# Patient Record
Sex: MALE | Race: BLACK OR AFRICAN AMERICAN | NOT HISPANIC OR LATINO | Employment: FULL TIME | URBAN - METROPOLITAN AREA
[De-identification: names, ages, dates, MRNs, and addresses within clinical notes are randomized per-mention and may not be internally consistent; named-entity substitution may affect disease eponyms.]

---

## 2018-12-24 ENCOUNTER — HOSPITAL ENCOUNTER (EMERGENCY)
Facility: HOSPITAL | Age: 48
Discharge: HOME | End: 2018-12-24
Attending: INTERNAL MEDICINE
Payer: MEDICAID

## 2018-12-24 VITALS
HEIGHT: 71 IN | RESPIRATION RATE: 16 BRPM | HEART RATE: 105 BPM | TEMPERATURE: 100 F | OXYGEN SATURATION: 99 % | WEIGHT: 240 LBS | SYSTOLIC BLOOD PRESSURE: 138 MMHG | DIASTOLIC BLOOD PRESSURE: 96 MMHG | BODY MASS INDEX: 33.6 KG/M2

## 2018-12-24 DIAGNOSIS — L03.317 CELLULITIS OF RIGHT BUTTOCK: Primary | ICD-10-CM

## 2018-12-24 PROCEDURE — 99283 EMERGENCY DEPT VISIT LOW MDM: CPT

## 2018-12-24 PROCEDURE — 25000003 PHARM REV CODE 250: Performed by: INTERNAL MEDICINE

## 2018-12-24 RX ORDER — IBUPROFEN 400 MG/1
800 TABLET ORAL
Status: COMPLETED | OUTPATIENT
Start: 2018-12-24 | End: 2018-12-24

## 2018-12-24 RX ORDER — DOXYCYCLINE 100 MG/1
100 CAPSULE ORAL 2 TIMES DAILY
Qty: 20 CAPSULE | Refills: 0 | Status: ON HOLD | OUTPATIENT
Start: 2018-12-24 | End: 2018-12-29 | Stop reason: HOSPADM

## 2018-12-24 RX ORDER — DOXYCYCLINE HYCLATE 100 MG
100 TABLET ORAL
Status: COMPLETED | OUTPATIENT
Start: 2018-12-24 | End: 2018-12-24

## 2018-12-24 RX ORDER — IBUPROFEN 800 MG/1
800 TABLET ORAL EVERY 8 HOURS PRN
Qty: 30 TABLET | Refills: 0 | Status: ON HOLD | OUTPATIENT
Start: 2018-12-24 | End: 2018-12-29 | Stop reason: HOSPADM

## 2018-12-24 RX ADMIN — IBUPROFEN 800 MG: 400 TABLET, FILM COATED ORAL at 06:12

## 2018-12-24 RX ADMIN — DOXYCYCLINE HYCLATE 100 MG: 100 TABLET, COATED ORAL at 06:12

## 2018-12-24 NOTE — ED PROVIDER NOTES
Encounter Date: 12/24/2018    SCRIBE #1 NOTE: I, Ophelia Urbina, am scribing for, and in the presence of,  Dr. Lion. I have scribed the following portions of the note - Other sections scribed: HPI, ROS, PE.       History     Chief Complaint   Patient presents with    Abscess     boil reported to top of gluteal fold that is swollen and painful with no drainage x2 days and subjective fevers      48 y.o male presents with boil on his right buttock for 2 days. He states it is painful and tender. He denies fever, chills, and drainage.       The history is provided by the patient.     Review of patient's allergies indicates:  No Known Allergies  History reviewed. No pertinent past medical history.  Past Surgical History:   Procedure Laterality Date    SKIN GRAFT      R leg    WRIST ARTHROPLASTY       History reviewed. No pertinent family history.  Social History     Tobacco Use    Smoking status: Current Every Day Smoker     Packs/day: 1.00     Years: 25.00     Pack years: 25.00     Types: Cigarettes   Substance Use Topics    Alcohol use: No    Drug use: No     Review of Systems   Constitutional: Negative for chills and fever.   Musculoskeletal:        Right buttock pain   Skin: Positive for color change.   All other systems reviewed and are negative.      Physical Exam     Initial Vitals [12/24/18 1742]   BP Pulse Resp Temp SpO2   (!) 174/95 108 16 99.7 °F (37.6 °C) 99 %      MAP       --         Physical Exam    Nursing note and vitals reviewed.  Constitutional: He appears well-developed and well-nourished. He is not diaphoretic. No distress.   HENT:   Head: Normocephalic and atraumatic.   Right Ear: External ear normal.   Left Ear: External ear normal.   Eyes: EOM are normal.   Neck: Normal range of motion.   Pulmonary/Chest: No respiratory distress.   Abdominal: Soft. Bowel sounds are normal. He exhibits no distension.   Musculoskeletal: Normal range of motion.   Neurological: He is alert and oriented to person,  place, and time. No cranial nerve deficit.   Skin: Skin is warm, dry and intact. Capillary refill takes less than 2 seconds.        Psychiatric: He has a normal mood and affect. His behavior is normal.         ED Course   Procedures  Labs Reviewed - No data to display       Imaging Results    None          Medical Decision Making:   Initial Assessment:   48 y.o male presents with boil on his right buttock for 2 days. He states it is painful and tender. He denies fever, chills, and drainage.             Scribe Attestation:   Scribe #1: I performed the above scribed service and the documentation accurately describes the services I performed. I attest to the accuracy of the note.    This document was produced by a scribe under my direction and in my presence. I agree with the content of the note and have made any necessary edits.     Dr. Lion    12/24/2018 6:09 PM             Clinical Impression:     1. Cellulitis of right buttock          Disposition:   Disposition: Discharged  Condition: Stable                        Genaro Lion MD  12/24/18 7780

## 2018-12-26 ENCOUNTER — HOSPITAL ENCOUNTER (INPATIENT)
Facility: HOSPITAL | Age: 48
LOS: 2 days | Discharge: HOME | DRG: 603 | End: 2018-12-29
Attending: EMERGENCY MEDICINE | Admitting: HOSPITALIST
Payer: MEDICAID

## 2018-12-26 DIAGNOSIS — D72.829 LEUKOCYTOSIS, UNSPECIFIED TYPE: ICD-10-CM

## 2018-12-26 DIAGNOSIS — Z78.9 FAILURE OF OUTPATIENT TREATMENT: ICD-10-CM

## 2018-12-26 DIAGNOSIS — L03.317 CELLULITIS OF BUTTOCK, RIGHT: Primary | ICD-10-CM

## 2018-12-26 LAB
ALBUMIN SERPL-MCNC: 3.5 G/DL (ref 3.3–5.5)
ALP SERPL-CCNC: 78 U/L (ref 42–141)
BILIRUB SERPL-MCNC: 0.4 MG/DL (ref 0.2–1.6)
BUN SERPL-MCNC: 15 MG/DL (ref 7–22)
CALCIUM SERPL-MCNC: 8.6 MG/DL (ref 8–10.3)
CHLORIDE SERPL-SCNC: 108 MMOL/L (ref 98–108)
CREAT SERPL-MCNC: 1.2 MG/DL (ref 0.6–1.2)
GLUCOSE SERPL-MCNC: 101 MG/DL (ref 73–118)
POC ALT (SGPT): 34 U/L (ref 10–47)
POC AST (SGOT): 28 U/L (ref 11–38)
POC TCO2: 28 MMOL/L (ref 18–33)
POTASSIUM BLD-SCNC: 3.8 MMOL/L (ref 3.6–5.1)
PROTEIN, POC: 6.5 G/DL (ref 6.4–8.1)
SODIUM BLD-SCNC: 145 MMOL/L (ref 128–145)

## 2018-12-26 PROCEDURE — 85025 COMPLETE CBC W/AUTO DIFF WBC: CPT

## 2018-12-26 PROCEDURE — 63600175 PHARM REV CODE 636 W HCPCS: Performed by: NURSE PRACTITIONER

## 2018-12-26 PROCEDURE — S0030 INJECTION, METRONIDAZOLE: HCPCS | Performed by: NURSE PRACTITIONER

## 2018-12-26 PROCEDURE — 96365 THER/PROPH/DIAG IV INF INIT: CPT

## 2018-12-26 PROCEDURE — 87040 BLOOD CULTURE FOR BACTERIA: CPT | Mod: 59

## 2018-12-26 PROCEDURE — 80053 COMPREHEN METABOLIC PANEL: CPT

## 2018-12-26 PROCEDURE — 25500020 PHARM REV CODE 255: Performed by: EMERGENCY MEDICINE

## 2018-12-26 PROCEDURE — 96361 HYDRATE IV INFUSION ADD-ON: CPT

## 2018-12-26 PROCEDURE — 96375 TX/PRO/DX INJ NEW DRUG ADDON: CPT

## 2018-12-26 PROCEDURE — 25000003 PHARM REV CODE 250: Performed by: NURSE PRACTITIONER

## 2018-12-26 PROCEDURE — 99285 EMERGENCY DEPT VISIT HI MDM: CPT | Mod: 25

## 2018-12-26 RX ORDER — METRONIDAZOLE 500 MG/100ML
500 INJECTION, SOLUTION INTRAVENOUS
Status: COMPLETED | OUTPATIENT
Start: 2018-12-26 | End: 2018-12-26

## 2018-12-26 RX ORDER — MORPHINE SULFATE 8 MG/ML
8 INJECTION INTRAMUSCULAR; INTRAVENOUS; SUBCUTANEOUS
Status: COMPLETED | OUTPATIENT
Start: 2018-12-26 | End: 2018-12-26

## 2018-12-26 RX ORDER — CIPROFLOXACIN 2 MG/ML
400 INJECTION, SOLUTION INTRAVENOUS
Status: DISCONTINUED | OUTPATIENT
Start: 2018-12-26 | End: 2018-12-26

## 2018-12-26 RX ADMIN — MORPHINE SULFATE 8 MG: 8 INJECTION INTRAVENOUS at 06:12

## 2018-12-26 RX ADMIN — METRONIDAZOLE 500 MG: 500 INJECTION, SOLUTION INTRAVENOUS at 10:12

## 2018-12-26 RX ADMIN — SODIUM CHLORIDE 1000 ML: 0.9 INJECTION, SOLUTION INTRAVENOUS at 06:12

## 2018-12-26 RX ADMIN — IOHEXOL 100 ML: 350 INJECTION, SOLUTION INTRAVENOUS at 07:12

## 2018-12-27 PROBLEM — Z72.0 TOBACCO ABUSE: Status: ACTIVE | Noted: 2018-12-27

## 2018-12-27 LAB
ANION GAP SERPL CALC-SCNC: 8 MMOL/L
BASOPHILS # BLD AUTO: 0.05 K/UL
BASOPHILS NFR BLD: 0.5 %
BUN SERPL-MCNC: 13 MG/DL
CALCIUM SERPL-MCNC: 8.9 MG/DL
CHLORIDE SERPL-SCNC: 104 MMOL/L
CO2 SERPL-SCNC: 30 MMOL/L
CREAT SERPL-MCNC: 1 MG/DL
DIFFERENTIAL METHOD: ABNORMAL
EOSINOPHIL # BLD AUTO: 0.3 K/UL
EOSINOPHIL NFR BLD: 3 %
ERYTHROCYTE [DISTWIDTH] IN BLOOD BY AUTOMATED COUNT: 13.2 %
EST. GFR  (AFRICAN AMERICAN): >60 ML/MIN/1.73 M^2
EST. GFR  (NON AFRICAN AMERICAN): >60 ML/MIN/1.73 M^2
GLUCOSE SERPL-MCNC: 103 MG/DL
HCT VFR BLD AUTO: 38.7 %
HGB BLD-MCNC: 11.7 G/DL
LYMPHOCYTES # BLD AUTO: 3.1 K/UL
LYMPHOCYTES NFR BLD: 31.4 %
MCH RBC QN AUTO: 28.5 PG
MCHC RBC AUTO-ENTMCNC: 30.2 G/DL
MCV RBC AUTO: 94 FL
MONOCYTES # BLD AUTO: 1.1 K/UL
MONOCYTES NFR BLD: 10.6 %
NEUTROPHILS # BLD AUTO: 5.4 K/UL
NEUTROPHILS NFR BLD: 54.5 %
PLATELET # BLD AUTO: 236 K/UL
PMV BLD AUTO: 10.5 FL
POTASSIUM SERPL-SCNC: 4.1 MMOL/L
RBC # BLD AUTO: 4.11 M/UL
SODIUM SERPL-SCNC: 142 MMOL/L
WBC # BLD AUTO: 9.89 K/UL

## 2018-12-27 PROCEDURE — 25000003 PHARM REV CODE 250: Performed by: NURSE PRACTITIONER

## 2018-12-27 PROCEDURE — S4991 NICOTINE PATCH NONLEGEND: HCPCS | Performed by: HOSPITALIST

## 2018-12-27 PROCEDURE — 63600175 PHARM REV CODE 636 W HCPCS: Performed by: NURSE PRACTITIONER

## 2018-12-27 PROCEDURE — 25000003 PHARM REV CODE 250: Performed by: EMERGENCY MEDICINE

## 2018-12-27 PROCEDURE — 25000003 PHARM REV CODE 250: Performed by: HOSPITALIST

## 2018-12-27 PROCEDURE — 63600175 PHARM REV CODE 636 W HCPCS: Performed by: HOSPITALIST

## 2018-12-27 PROCEDURE — 80048 BASIC METABOLIC PNL TOTAL CA: CPT

## 2018-12-27 PROCEDURE — 96376 TX/PRO/DX INJ SAME DRUG ADON: CPT

## 2018-12-27 PROCEDURE — 85025 COMPLETE CBC W/AUTO DIFF WBC: CPT

## 2018-12-27 PROCEDURE — S0030 INJECTION, METRONIDAZOLE: HCPCS | Performed by: NURSE PRACTITIONER

## 2018-12-27 PROCEDURE — 11000001 HC ACUTE MED/SURG PRIVATE ROOM

## 2018-12-27 PROCEDURE — 36415 COLL VENOUS BLD VENIPUNCTURE: CPT

## 2018-12-27 RX ORDER — CLONIDINE HYDROCHLORIDE 0.1 MG/1
0.1 TABLET ORAL
Status: DISCONTINUED | OUTPATIENT
Start: 2018-12-27 | End: 2018-12-27

## 2018-12-27 RX ORDER — CLONIDINE HYDROCHLORIDE 0.1 MG/1
0.3 TABLET ORAL
Status: COMPLETED | OUTPATIENT
Start: 2018-12-27 | End: 2018-12-27

## 2018-12-27 RX ORDER — MORPHINE SULFATE 10 MG/ML
5 INJECTION INTRAMUSCULAR; INTRAVENOUS; SUBCUTANEOUS EVERY 4 HOURS PRN
Status: DISCONTINUED | OUTPATIENT
Start: 2018-12-27 | End: 2018-12-29 | Stop reason: HOSPADM

## 2018-12-27 RX ORDER — IBUPROFEN 200 MG
1 TABLET ORAL DAILY
Status: DISCONTINUED | OUTPATIENT
Start: 2018-12-27 | End: 2018-12-29 | Stop reason: HOSPADM

## 2018-12-27 RX ORDER — METRONIDAZOLE 500 MG/100ML
500 INJECTION, SOLUTION INTRAVENOUS
Status: DISCONTINUED | OUTPATIENT
Start: 2018-12-27 | End: 2018-12-29 | Stop reason: HOSPADM

## 2018-12-27 RX ORDER — OXYCODONE AND ACETAMINOPHEN 5; 325 MG/1; MG/1
1 TABLET ORAL EVERY 6 HOURS PRN
Status: DISCONTINUED | OUTPATIENT
Start: 2018-12-27 | End: 2018-12-29 | Stop reason: HOSPADM

## 2018-12-27 RX ORDER — MORPHINE SULFATE 10 MG/ML
2 INJECTION INTRAMUSCULAR; INTRAVENOUS; SUBCUTANEOUS EVERY 4 HOURS PRN
Status: DISCONTINUED | OUTPATIENT
Start: 2018-12-27 | End: 2018-12-27

## 2018-12-27 RX ORDER — ONDANSETRON 2 MG/ML
4 INJECTION INTRAMUSCULAR; INTRAVENOUS EVERY 8 HOURS PRN
Status: DISCONTINUED | OUTPATIENT
Start: 2018-12-27 | End: 2018-12-29 | Stop reason: HOSPADM

## 2018-12-27 RX ORDER — OXYCODONE AND ACETAMINOPHEN 10; 325 MG/1; MG/1
1 TABLET ORAL EVERY 6 HOURS PRN
Status: DISCONTINUED | OUTPATIENT
Start: 2018-12-27 | End: 2018-12-29 | Stop reason: HOSPADM

## 2018-12-27 RX ORDER — MORPHINE SULFATE 10 MG/ML
4 INJECTION INTRAMUSCULAR; INTRAVENOUS; SUBCUTANEOUS EVERY 4 HOURS PRN
Status: DISCONTINUED | OUTPATIENT
Start: 2018-12-27 | End: 2018-12-27

## 2018-12-27 RX ORDER — ACETAMINOPHEN 325 MG/1
650 TABLET ORAL EVERY 6 HOURS PRN
Status: DISCONTINUED | OUTPATIENT
Start: 2018-12-27 | End: 2018-12-29 | Stop reason: HOSPADM

## 2018-12-27 RX ORDER — MORPHINE SULFATE 8 MG/ML
4 INJECTION INTRAMUSCULAR; INTRAVENOUS; SUBCUTANEOUS EVERY 4 HOURS PRN
Status: DISCONTINUED | OUTPATIENT
Start: 2018-12-27 | End: 2018-12-27 | Stop reason: SDUPTHER

## 2018-12-27 RX ADMIN — OXYCODONE HYDROCHLORIDE AND ACETAMINOPHEN 1 TABLET: 10; 325 TABLET ORAL at 04:12

## 2018-12-27 RX ADMIN — AMPICILLIN AND SULBACTAM 1.5 G: 1; .5 INJECTION, POWDER, FOR SOLUTION INTRAVENOUS at 04:12

## 2018-12-27 RX ADMIN — MORPHINE SULFATE 5 MG: 10 INJECTION INTRAVENOUS at 05:12

## 2018-12-27 RX ADMIN — NICOTINE 1 PATCH: 21 PATCH, EXTENDED RELEASE TRANSDERMAL at 08:12

## 2018-12-27 RX ADMIN — CLONIDINE HYDROCHLORIDE 0.3 MG: 0.1 TABLET ORAL at 12:12

## 2018-12-27 RX ADMIN — MORPHINE SULFATE 4 MG: 8 INJECTION INTRAVENOUS at 12:12

## 2018-12-27 RX ADMIN — OXYCODONE HYDROCHLORIDE AND ACETAMINOPHEN 1 TABLET: 10; 325 TABLET ORAL at 11:12

## 2018-12-27 RX ADMIN — AMPICILLIN AND SULBACTAM 1.5 G: 1; .5 INJECTION, POWDER, FOR SOLUTION INTRAVENOUS at 08:12

## 2018-12-27 RX ADMIN — MORPHINE SULFATE 5 MG: 10 INJECTION INTRAVENOUS at 10:12

## 2018-12-27 RX ADMIN — AMPICILLIN AND SULBACTAM 1.5 G: 1; .5 INJECTION, POWDER, FOR SOLUTION INTRAVENOUS at 09:12

## 2018-12-27 RX ADMIN — OXYCODONE HYDROCHLORIDE AND ACETAMINOPHEN 1 TABLET: 10; 325 TABLET ORAL at 08:12

## 2018-12-27 RX ADMIN — METRONIDAZOLE 500 MG: 500 INJECTION, SOLUTION INTRAVENOUS at 11:12

## 2018-12-27 RX ADMIN — METRONIDAZOLE 500 MG: 500 INJECTION, SOLUTION INTRAVENOUS at 06:12

## 2018-12-27 RX ADMIN — METRONIDAZOLE 500 MG: 500 INJECTION, SOLUTION INTRAVENOUS at 02:12

## 2018-12-27 NOTE — ED PROVIDER NOTES
Encounter Date: 12/26/2018       History     Chief Complaint   Patient presents with    Abscess     pt reports he has had a boil on the top of buttocks x 3 days. pt reports boil drained yesterday. pt reports taking prescribed medication for abscess that was given earlier this week but denies any relief     CC: Abscess    HPI: Rajeev Chowdhury, a 48 y.o. male that presents to the ED for drainage from an abscess to the right buttock.  He was seen in this emergency department 2 days ago for similar, prescribed antibiotics.  He reports that the wound started draining this morning.  He reports draining pus and blood.  He reports adherence to his doxycycline regimen.  He reports no fevers or abdominal pain.      The history is provided by the patient. No  was used.     Review of patient's allergies indicates:  No Known Allergies  History reviewed. No pertinent past medical history.  Past Surgical History:   Procedure Laterality Date    SKIN GRAFT      R leg    WRIST ARTHROPLASTY       History reviewed. No pertinent family history.  Social History     Tobacco Use    Smoking status: Current Every Day Smoker     Packs/day: 1.00     Years: 25.00     Pack years: 25.00     Types: Cigarettes   Substance Use Topics    Alcohol use: No    Drug use: No     Review of Systems   Constitutional: Negative for fever.   HENT: Negative for trouble swallowing.    Respiratory: Negative for shortness of breath.    Cardiovascular: Negative for chest pain.   Gastrointestinal: Negative for abdominal pain and vomiting.   Musculoskeletal: Negative for back pain and neck pain.   Skin: Positive for color change ( erythema the buttock) and wound (Buttock).   Psychiatric/Behavioral: Negative for confusion.       Physical Exam     Initial Vitals [12/26/18 1816]   BP Pulse Resp Temp SpO2   (S) (!) 168/114 95 18 97.9 °F (36.6 °C) 98 %      MAP       --         Physical Exam    Nursing note and vitals reviewed.  Constitutional: He  appears well-developed and well-nourished. He is not diaphoretic. He is cooperative.  Non-toxic appearance. No distress.   HENT:   Head: Normocephalic and atraumatic.   Right Ear: External ear normal.   Left Ear: External ear normal.   Mouth/Throat: Oropharynx is clear and moist.   Eyes: Conjunctivae and EOM are normal.   Neck: Normal range of motion.   Cardiovascular: Normal rate and regular rhythm.   Pulmonary/Chest: No respiratory distress.   Abdominal: He exhibits no distension. There is no tenderness. There is no rebound.   Musculoskeletal: Normal range of motion.   Neurological: He is alert and oriented to person, place, and time. GCS eye subscore is 4. GCS verbal subscore is 5. GCS motor subscore is 6.   Skin: Skin is warm and dry. Abscess noted. No rash noted. There is erythema.        Erythema and induration to the right of the gluteal cleft. Induration extends inferiorly towards rectum.    Psychiatric: He has a normal mood and affect. His behavior is normal. Judgment and thought content normal.         ED Course   Procedures  Labs Reviewed   POCT CMP - Abnormal; Notable for the following components:       Result Value    POC Chloride 108 (*)     All other components within normal limits   CULTURE, BLOOD   CULTURE, BLOOD   POCT CBC   POCT CMP          Imaging Results          CT Pelvis With Contrast (Final result)  Result time 12/26/18 19:45:30    Final result by Freddy Lion III, MD (12/26/18 19:45:30)                 Impression:      See above    Right paramidline gluteal fold cellulitis.  A fistulous tract to the anorectal junction cannot be excluded.  MRI may be better suited for this possibility.      Electronically signed by: Freddy Lion MD  Date:    12/26/2018  Time:    19:45             Narrative:    EXAMINATION:  CT PELVIS WITH  CONTRAST    CLINICAL HISTORY:  Abscess of anal and rectal regions;    FINDINGS:  Patient was administered 100 cc of Omnipaque 350.  There is a cellulitis and edema  pattern of the right paramidline gluteal fold fat.  A fistulous connection to the anorectal junction cannot be excluded.  No discrete drainable abscess is seen.  This is more likely cellulitis.                                 Medical Decision Making:   History:   Old Medical Records: I decided to obtain old medical records.  Old Records Summarized: records from previous admission(s).       <> Summary of Records: Patient evaluated in this emergency department on 12/24/2018.  Diagnosed with cellulitis of the right buttock.  The wound was not drained at that time.         APC / Resident Notes:   This is an evaluation of a 48 y.o. male that presents to the Emergency Department for a draining wound/abscess. Patient was seen here on 12/24/2018 and diagnosed with cellulitis. Started draining today. Physical Exam shows a non-toxic, afebrile, and well appearing male. There is a 10 Sup/Inf cm x 4 Med/Lat cm area of induration and erythema to the right side of the gluteal cleft. The induration is extending down towards the rectum and is painful to palpation. There is drainage present on the skin but not drainage expressed on palpation. Results:  CBC with a leukocytosis at 12.3.  Elevation granulocytes today 8.8.  Normal platelet count.  Chemistry with a chloride of 108, otherwise unremarkable. CT of the pelvis with gluteal fold cellulitis and unable to rule out a fistulous tract to the anorectal junction.     Consult: After reviewing the CT scan and lab work with Dr. Leiva, General Surgery at  was consulted. I spoke with Dr. Jain (gen sx resident) and reviewed the case. She will speak with her attending and return the call. I called Dr. Jain back to discuss. She advised there would be no emergent cause to take to surgery at this time. Will admit to medicine. Dr. Phillip Paged with  at [facility].  I spoke with Dr. Phillip regarding the case.  He will accept admission to the hospital service on behalf of Dr. Ribera.   Recommendations for ampicillin and Flagyl.    My overall impression is cellulitis of the right buttock and failed outpatient therapy given he has been on the doxycycline for 48 hr.  No definite abscess identified to drain in the emergency department.    ED course: IV Abx, IV Fluids, IV Pain Medication. After review of the patients physical exam, ED testing, and history/symptoms, the patient will be admitted. IM was paged. Call returned and the case was discussed.  Dr. Phillip will accept the admission to the IM Service for Dr. Zavala with recommendations for IV Abx and Blood Cultures.  Admit orders will be completed. The diagnosis, treatment and plan for admission were discussed with the patient and understanding was verbalized. All questions or concerns have been addressed. This case was discussed with Dr. Leiva who is in agreement with my assessment and plan. PRISCILA Angeles, SELVIN                 ED Course as of Dec 26 2155   Wed Dec 26, 2018   1813 Triage Sort Note: Rajeev Chowdhury, a nontoxic/well appearing, 48 y.o. male, presented to the ED with c/o abscess to top of buttocks. He states it has been there for 3 days. It did pop and blood pus came out.     Patient seen and medically screened by Nurse Practitioner in triage. Orders initiated at triage to expedite care. Care will be transferred to an alternate provider when patient was placed in an Exam Room from the [facility] for physical exam, additional orders, and disposition.  6:15 PM Gaye Busch DNP, ANITA-BC      [AT]   2010 Call Placed to Gen S.   [AF]      ED Course User Index  [AF] ANITA Cerrato  [AT] ANITA Alfonso     Clinical Impression:   The primary encounter diagnosis was Cellulitis of buttock, right. Diagnoses of Failure of outpatient treatment and Leukocytosis, unspecified type were also pertinent to this visit.      Disposition:   Disposition: Admitted  Condition: Stable                        ANITA Cerrato  12/26/18 2156

## 2018-12-27 NOTE — H&P
[facility] Hospital Medicine  History & Physical    Patient Name: Rajeev Chowdhury  MRN: [id number]  Admission Date: 12/27/2018  Attending Physician: Des Phillip MD, MPH      PCP:     Primary Doctor No    CC:     Chief Complaint   Patient presents with    Abscess     pt reports he has had a boil on the top of buttocks x 3 days. pt reports boil drained yesterday. pt reports taking prescribed medication for abscess that was given earlier this week but denies any relief       HISTORY OF PRESENT ILLNESS:     Rajeev Chowdhury is a 48 y.o. male that (in part)  has no past medical history on file.  has a past surgical history that includes Wrist Arthroplasty and Skin graft. Presents to [facility] as a transfer patient from the [facility] stand-alone Emergency Department where he sought evaluation for erythema, pain, and warmth of his right upper buttocks.  He had an abscess drained in the area 2 days prior to presentation.  He is prescribed doxycycline after that and discharged home.  He has been compliant with his home medication regimen.  However he has had associated purulence and bloody drainage from the wound with increased warmth, edema, and erythema.  Daily frequency.  Constant duration.  Radiation throughout gluteal cleft.      In the emergency department routine laboratory studies were obtained which showed evidence of leukocytosis.  A call was placed to General surgery case discussed with him.  CT was performed that did no show evidence of further abscess formation.  Felt most likely to be cellulitis S failed outpatient antibiotic therapy.  Cultures were obtained and the patient was started on ampicillin sulbactam and Flagyl.    Hospital medicine has been asked to admit for further evaluation and treatment as a transfer patient.       REVIEW OF SYSTEMS:     -- Constitutional: No fever or chills.  -- Eyes: No visual changes, diplopia, pain, tearing, blind spots, or  discharge.   -- Ears, nose, mouth, throat, and face: No congestion, sore throat, epistaxis, d/c, bleeding gums, neck stiffness masses, or dental issues.  -- Respiratory: No cough, shortness of breath, hemoptysis, stridor, wheezing, or night sweats.  -- Cardiovascular: No chest pain, PAYNE, syncope, PND, edema, cyanosis, or palpitations.   -- Gastrointestinal: No vomiting, abdominal pain, hematemesis, melena, dyspepsia, or change in bowel habits.  -- Genitourinary: No hematuria, dysuria, frequency, urgency, nocturia, polyuria, stones, or incontinence.  -- Integument/breast:  As above in HPI  -- Hematologic/lymphatic: No easy bruising or lymphadenopathy.   -- Musculoskeletal:  Buttock pain. No acute arthralgias, acute myalgias, joint swelling, acute limitations of ROM, or acute muscular weakness.  -- Neurological: No seizures, headaches, incoordination, paraesthesias, ataxia, vertigo, or tremors.  -- Behavioral/Psych: No auditory or visual hallucinations, depression, or suicidal/homicidal ideations.  -- Endocrine: No heat or cold intolerance, polydipsia, or unintentional weight gain / loss.  -- Allergy/Immunologic: No recurrent infections or adverse reaction to food, insects, or difficulty breathing.    Pain Scale  5 on scale of 1 to 10    PAST MEDICAL / SURGICAL HISTORY:   History reviewed. No pertinent past medical history.  Past Surgical History:   Procedure Laterality Date    SKIN GRAFT      R leg    WRIST ARTHROPLASTY           FAMILY HISTORY:     Family History   Problem Relation Age of Onset    Diabetes Mother     Hypertension Mother     Hypertension Father     Diabetes Father     Diabetes Maternal Aunt     Diabetes Maternal Grandmother     Hypertension Maternal Grandmother          SOCIAL HISTORY:     Social History     Socioeconomic History    Marital status:      Spouse name: None    Number of children: None    Years of education: None    Highest education level: None   Social Needs     Financial resource strain: None    Food insecurity - worry: None    Food insecurity - inability: None    Transportation needs - medical: None    Transportation needs - non-medical: None   Occupational History    None   Tobacco Use    Smoking status: Current Every Day Smoker     Packs/day: 1.00     Years: 30.00     Pack years: 30.00     Types: Cigarettes   Substance and Sexual Activity    Alcohol use: No     Comment: less than once per week    Drug use: No    Sexual activity: None   Other Topics Concern    None   Social History Narrative    None         ALLERGIES:       Review of patient's allergies indicates:  No Known Allergies      HEALTH SCREENING:     Influenza vaccine not up-to-date for this season.        HOME MEDICATIONS:     Prior to Admission medications    Medication Sig Start Date End Date Taking? Authorizing Provider   doxycycline (VIBRAMYCIN) 100 MG Cap Take 1 capsule (100 mg total) by mouth 2 (two) times daily. for 10 days 12/24/18 1/3/19 Yes Genaro Lion MD   ibuprofen (ADVIL,MOTRIN) 800 MG tablet Take 1 tablet (800 mg total) by mouth every 8 (eight) hours as needed for Pain. 12/24/18  Yes Genaro Lion MD   albuterol 90 mcg/actuation inhaler Inhale 2 puffs into the lungs every 4 (four) hours as needed for Wheezing. 7/9/16 8/8/16  Martha Belcher MD          [facility] MEDICATIONS:     Scheduled Meds:    ampicillin-sulbactim (UNASYN) IVPB  1.5 g Intravenous Q6H    metronidazole  500 mg Intravenous Q8H    nicotine  1 patch Transdermal Daily     Continuous Infusions:   PRN Meds: acetaminophen, morphine, ondansetron, oxyCODONE-acetaminophen, oxyCODONE-acetaminophen      PHYSICAL EXAM:     Wt Readings from Last 1 Encounters:   12/27/18 0212 108.2 kg (238 lb 9.6 oz)   12/26/18 1816 108.9 kg (240 lb)     Body mass index is 33.28 kg/m².  Vitals:    12/27/18 0049 12/27/18 0211 12/27/18 0212 12/27/18 0357   BP: (!) 204/111  (!) 177/97 (!) 156/91   BP Location:   Right arm Right arm  "  Patient Position:   Lying Lying   Pulse: 97 84 84 80   Resp:  18 20 18   Temp:  98.3 °F (36.8 °C) 98.3 °F (36.8 °C) 98 °F (36.7 °C)   TempSrc:  Oral Oral Oral   SpO2: 96% 95% (!) 92% (!) 94%   Weight:   108.2 kg (238 lb 9.6 oz)    Height:   5' 11" (1.803 m)         -- General appearance: well developed. appears stated age   -- Head: normocephalic, atraumatic   -- Eyes: conjunctivae clear. Extraocular muscles intact  -- Nose: Nares normal. Septum midline.   -- Mouth/Throat: lips, mucosa, and tongue normal. no throat erythema.   -- Neck: supple, symmetrical, trachea midline, no JVD and thyroid not grossly enlarged, appears symmetric  -- Lungs: clear to auscultation bilaterally. normal respiratory effort. No use of accessory muscles.   -- Chest wall: no tenderness. equal bilateral chest rise   -- Heart: regular rate and regular rhythm. S1, S2 normal.  no click, rub or gallop   -- Abdomen: soft, non-tender, non-distended, non-tympanic; bowel sounds normal; no masses  -- Extremities: no cyanosis, clubbing or edema.   -- Pulses: 2+ and symmetric   -- Skin: 10 Sup/Inf cm x 4 Med/Lat cm area of induration and erythema to the right side of the gluteal cleft  -- Neurologic: Normal strength and tone. No focal numbness or weakness. CNII-XII intact. Pablo coma scale: eyes open spontaneously-4, oriented & converses-5, obeys commands-6.      LABORATORY STUDIES:     Recent Results (from the past 36 hour(s))   POCT CMP    Collection Time: 12/26/18  7:03 PM   Result Value Ref Range    Albumin, POC 3.5 3.3 - 5.5 g/dL    Alkaline Phosphatase, POC 78 42 - 141 U/L    ALT (SGPT), POC 34 10 - 47 U/L    AST (SGOT), POC 28 11 - 38 U/L    POC BUN 15 7 - 22 mg/dL    Calcium, POC 8.6 8.0 - 10.3 mg/dL    POC Chloride 108 (H) 98 - 108 mmol/L    POC Creatinine 1.2 0.6 - 1.2 mg/dL    POC Glucose 101 73 - 118 mg/dL    POC Potassium 3.8 3.6 - 5.1 mmol/L    POC Sodium 145 128 - 145 mmol/L    Bilirubin 0.4 0.2 - 1.6 mg/dL    POC TCO2 28 18 - 33 " mmol/L    Protein 6.5 6.4 - 8.1 g/dL       No results found for: INR, PROTIME  No results found for: HGBA1C  No results for input(s): POCTGLUCOSE in the last 72 hours.        MICROBIOLOGY DATA:     No results found for: LABGENI, LABREFE, LABUPPE, LABURIN, LABAFB    Microbiology x 7d:   Microbiology Results (last 7 days)     Procedure Component Value Units Date/Time    Blood Culture #2 **CANNOT BE ORDERED STAT** [[id number]] Collected:  12/26/18 2236    Order Status:  Sent Specimen:  Blood from Peripheral, Forearm, Right Updated:  12/26/18 2303    Blood Culture #1 **CANNOT BE ORDERED STAT** [[id number]] Collected:  12/26/18 2233    Order Status:  Sent Specimen:  Blood from Peripheral, Hand, Right Updated:  12/26/18 2302            IMAGING:     Imaging Results          CT Pelvis With Contrast (Final result)  Result time 12/26/18 19:45:30    Final result by Freddy Lion III, MD (12/26/18 19:45:30)                 Impression:      See above    Right paramidline gluteal fold cellulitis.  A fistulous tract to the anorectal junction cannot be excluded.  MRI may be better suited for this possibility.      Electronically signed by: Freddy Lion MD  Date:    12/26/2018  Time:    19:45             Narrative:    EXAMINATION:  CT PELVIS WITH  CONTRAST    CLINICAL HISTORY:  Abscess of anal and rectal regions;    FINDINGS:  Patient was administered 100 cc of Omnipaque 350.  There is a cellulitis and edema pattern of the right paramidline gluteal fold fat.  A fistulous connection to the anorectal junction cannot be excluded.  No discrete drainable abscess is seen.  This is more likely cellulitis.                                    ASSESSMENT & PLAN:     Primary Diagnosis:  Cellulitis of gluteal region    Active Hospital Problems    Diagnosis  POA    *Cellulitis of gluteal region [L03.317]  Yes     Priority: 1 - High    Tobacco abuse [Z72.0]  Yes      Resolved Hospital Problems   No resolved problems to display.        Cellulitis of right gluteus status post I&D of abscess  · As evidence by history, physical exam, and CT imaging  · Cultures obtained but likely be low yield in cellulitis.  · Appears to have failed outpatient antibiotic therapy with doxycycline therefore being admitted for IV antibiotics  · Wound care      Tobacco abuse   · Chronic tobacco use  · Tobacco cessation counseling  · Nicotine patch offered; consider Wellbutrin or Chantix through PCP as an outpatient (will require closer monitoring)  · I discussed with the patient regarding the hazardous effects of smoking on increasing risk of heart attack and stroke, worsening lung functions, and increasing cancer risk.   Patient was urged to stop smoking now.  I also offered nicotine taper (such as nicotine patch and gum) to help ease the craving to smoke.          VTE Risk Mitigation (From admission, onward)        Ordered     IP VTE HIGH RISK PATIENT  Once      12/27/18 0155     Place sequential compression device  Until discontinued      12/27/18 0155     Place BIBI hose  Until discontinued      12/27/18 0155            Adult PRN medications available   DVT prophylaxis given       DISPOSITION:     Will admit to the Hospital Medicine service for further evaluation and treatment.    Chart reviewed and updated where applicable.    High Risk Conditions:  Patient has a condition that poses threat to life and bodily function:  Right gluteal cellulitis, failed outpatient antibiotic therapy      ===============================================================    Des Phillip MD, MPH  Department of Hospital Medicine   [facility]  [phone] pg  (7pm - 6am)          This note is dictated using Tap[employer]ame voice recognition software.  There are word recognition mistakes that are occasionally missed on review.

## 2018-12-27 NOTE — HPI
Rajeev Chowdhury is a 48 y.o. male that (in part)  has no past medical history on file.  has a past surgical history that includes Wrist Arthroplasty and Skin graft. Presents to [facility] as a transfer patient from the [facility] stand-alone Emergency Department where he sought evaluation for erythema, pain, and warmth of his right upper buttocks.  He had an abscess drained in the area 2 days prior to presentation.  He is prescribed doxycycline after that and discharged home.  He has been compliant with his home medication regimen.  However he has had associated purulence and bloody drainage from the wound with increased warmth, edema, and erythema.  Daily frequency.  Constant duration.  Radiation throughout gluteal cleft.      In the emergency department routine laboratory studies were obtained which showed evidence of leukocytosis.  A call was placed to General surgery case discussed with him.  CT was performed that did no show evidence of further abscess formation.  Felt most likely to be cellulitis S failed outpatient antibiotic therapy.  Cultures were obtained and the patient was started on ampicillin sulbactam and Flagyl.    Hospital medicine has been asked to admit for further evaluation and treatment as a transfer patient.

## 2018-12-27 NOTE — ED NOTES
Family at bedside. Given update on transport and room he is being admitted to at [facility].

## 2018-12-27 NOTE — PROGRESS NOTES
Assumed care from Dr Phillip. Patient admitted for cellulitis of gluteal cleft. On antibiotics. No signs of sepsis. Continue current management.  Val Zvaala MD  12/27/2018 9:58 AM

## 2018-12-27 NOTE — ED NOTES
Pt resting on stretcher on his right side. RR even and unlabored, Pt reports severe pain in the right buttock region. Denies any other symptoms.  WNL, GI WNL, Neuro WNL, Wound noted to Right buttock at fold, Redness and induration present, no drainage noted at this time. Will continue to monitor pt.

## 2018-12-27 NOTE — ED NOTES
Update pt on status of care and transfer.  After explanation of risks/benefits of transfer, pt verbally agrees to transfer.  Pt state he is unable to sign and requests his girlfriend Jami Russell sign for him.  Witnessed by myself.

## 2018-12-27 NOTE — PLAN OF CARE
Problem: Adult Inpatient Plan of Care  Goal: Plan of Care Review  Outcome: Ongoing (interventions implemented as appropriate)  Plan of care established. IV antibiotics administered as ordered. Complaint of severe pain moderately relieved with PRN medication. Bed alarm armed and audible. Remains free from falls or trauma.

## 2018-12-28 PROBLEM — L03.317 CELLULITIS OF RIGHT BUTTOCK: Status: RESOLVED | Noted: 2018-12-24 | Resolved: 2018-12-28

## 2018-12-28 LAB
ANION GAP SERPL CALC-SCNC: 9 MMOL/L
BASOPHILS # BLD AUTO: 0.08 K/UL
BASOPHILS NFR BLD: 1 %
BUN SERPL-MCNC: 16 MG/DL
CALCIUM SERPL-MCNC: 8.4 MG/DL
CHLORIDE SERPL-SCNC: 105 MMOL/L
CO2 SERPL-SCNC: 28 MMOL/L
CREAT SERPL-MCNC: 1.1 MG/DL
DIFFERENTIAL METHOD: ABNORMAL
EOSINOPHIL # BLD AUTO: 0.3 K/UL
EOSINOPHIL NFR BLD: 3.4 %
ERYTHROCYTE [DISTWIDTH] IN BLOOD BY AUTOMATED COUNT: 13.1 %
EST. GFR  (AFRICAN AMERICAN): >60 ML/MIN/1.73 M^2
EST. GFR  (NON AFRICAN AMERICAN): >60 ML/MIN/1.73 M^2
GLUCOSE SERPL-MCNC: 119 MG/DL
HCT VFR BLD AUTO: 38.6 %
HGB BLD-MCNC: 12.3 G/DL
LYMPHOCYTES # BLD AUTO: 2.8 K/UL
LYMPHOCYTES NFR BLD: 35.3 %
MCH RBC QN AUTO: 29.5 PG
MCHC RBC AUTO-ENTMCNC: 31.9 G/DL
MCV RBC AUTO: 93 FL
MONOCYTES # BLD AUTO: 0.6 K/UL
MONOCYTES NFR BLD: 7.3 %
NEUTROPHILS # BLD AUTO: 4.3 K/UL
NEUTROPHILS NFR BLD: 53 %
PLATELET # BLD AUTO: 248 K/UL
PMV BLD AUTO: 10.5 FL
POTASSIUM SERPL-SCNC: 3.7 MMOL/L
RBC # BLD AUTO: 4.17 M/UL
SODIUM SERPL-SCNC: 142 MMOL/L
WBC # BLD AUTO: 8.05 K/UL

## 2018-12-28 PROCEDURE — S4991 NICOTINE PATCH NONLEGEND: HCPCS | Performed by: HOSPITALIST

## 2018-12-28 PROCEDURE — 36415 COLL VENOUS BLD VENIPUNCTURE: CPT

## 2018-12-28 PROCEDURE — S0030 INJECTION, METRONIDAZOLE: HCPCS | Performed by: NURSE PRACTITIONER

## 2018-12-28 PROCEDURE — 80048 BASIC METABOLIC PNL TOTAL CA: CPT

## 2018-12-28 PROCEDURE — 85025 COMPLETE CBC W/AUTO DIFF WBC: CPT

## 2018-12-28 PROCEDURE — 63600175 PHARM REV CODE 636 W HCPCS: Performed by: NURSE PRACTITIONER

## 2018-12-28 PROCEDURE — 11000001 HC ACUTE MED/SURG PRIVATE ROOM

## 2018-12-28 PROCEDURE — 25000003 PHARM REV CODE 250: Performed by: NURSE PRACTITIONER

## 2018-12-28 PROCEDURE — 25000003 PHARM REV CODE 250: Performed by: HOSPITALIST

## 2018-12-28 RX ORDER — HYDRALAZINE HYDROCHLORIDE 25 MG/1
25 TABLET, FILM COATED ORAL EVERY 8 HOURS PRN
Status: DISCONTINUED | OUTPATIENT
Start: 2018-12-28 | End: 2018-12-29 | Stop reason: HOSPADM

## 2018-12-28 RX ADMIN — METRONIDAZOLE 500 MG: 500 INJECTION, SOLUTION INTRAVENOUS at 10:12

## 2018-12-28 RX ADMIN — METRONIDAZOLE 500 MG: 500 INJECTION, SOLUTION INTRAVENOUS at 06:12

## 2018-12-28 RX ADMIN — AMPICILLIN AND SULBACTAM 1.5 G: 1; .5 INJECTION, POWDER, FOR SOLUTION INTRAVENOUS at 02:12

## 2018-12-28 RX ADMIN — NICOTINE 1 PATCH: 21 PATCH, EXTENDED RELEASE TRANSDERMAL at 08:12

## 2018-12-28 RX ADMIN — OXYCODONE HYDROCHLORIDE AND ACETAMINOPHEN 1 TABLET: 10; 325 TABLET ORAL at 02:12

## 2018-12-28 RX ADMIN — AMPICILLIN AND SULBACTAM 1.5 G: 1; .5 INJECTION, POWDER, FOR SOLUTION INTRAVENOUS at 03:12

## 2018-12-28 RX ADMIN — METRONIDAZOLE 500 MG: 500 INJECTION, SOLUTION INTRAVENOUS at 02:12

## 2018-12-28 RX ADMIN — AMPICILLIN AND SULBACTAM 1.5 G: 1; .5 INJECTION, POWDER, FOR SOLUTION INTRAVENOUS at 08:12

## 2018-12-28 NOTE — HOSPITAL COURSE
Admitted for cellulitis of right buttock. Started on unasyn and flagyl. No signs of sepsis. Clinically improved. Discharged on augmentin x7 days. Patient has had some hypertension while in hospital, which may be due to pain or underlying essential hypertension. Will need to be rechecked as outpatient. Counseled on smoking cessation. Need to reassess as outpatient. Patient needs to establish PCP- he will be given contact information.

## 2018-12-28 NOTE — NURSING
Not interested in breakfast at this time.  700 ml yeimi urine noted to the urinal.  Stated that he did not need any pain meds at this time, will monitor

## 2018-12-28 NOTE — ASSESSMENT & PLAN NOTE
Presented with pain of right buttock near gluteal cleft  CT confirmed right gluteal fold cellulitis. Surgery reviewed images and stated that he could be managed medically with antibiotics  Started on unasyn and flagyl with improvement in cellulitis  No signs of sepsis  Continue to monitor  Patient is using IV pain medications sparingly

## 2018-12-28 NOTE — SUBJECTIVE & OBJECTIVE
Interval History: Right buttock pain improved. Feels that there is a new area developing on his lower left buttock near his anus. No fevers. Pain controlled.     Review of Systems   Constitutional: Negative for fever.   Respiratory: Negative for shortness of breath.    Cardiovascular: Negative for chest pain and leg swelling.   Gastrointestinal: Negative for abdominal pain, constipation, diarrhea, nausea and vomiting.   Skin: Positive for rash.     Objective:     Vital Signs (Most Recent):  Temp: 98.1 °F (36.7 °C) (12/28/18 0807)  Pulse: 80 (12/28/18 0807)  Resp: 17 (12/28/18 0807)  BP: (!) 156/86 (12/28/18 0807)  SpO2: 95 % (12/28/18 0807) Vital Signs (24h Range):  Temp:  [97.2 °F (36.2 °C)-98.2 °F (36.8 °C)] 98.1 °F (36.7 °C)  Pulse:  [80-92] 80  Resp:  [16-18] 17  SpO2:  [94 %-98 %] 95 %  BP: (152-183)/() 156/86     Weight: 108.2 kg (238 lb 9.6 oz)  Body mass index is 33.28 kg/m².    Intake/Output Summary (Last 24 hours) at 12/28/2018 0813  Last data filed at 12/27/2018 1800  Gross per 24 hour   Intake 360 ml   Output 700 ml   Net -340 ml      Physical Exam   Constitutional: He appears well-developed and well-nourished. No distress.   HENT:   Head: Normocephalic and atraumatic.   Cardiovascular: Normal rate, regular rhythm, normal heart sounds and intact distal pulses. Exam reveals no gallop and no friction rub.   No murmur heard.  Pulmonary/Chest: Effort normal and breath sounds normal. No stridor. No respiratory distress. He has no wheezes. He has no rales.   Abdominal: Soft. Bowel sounds are normal. He exhibits no distension and no mass. There is no tenderness. There is no guarding.   Musculoskeletal: He exhibits no edema.   Neurological: He is alert.   Skin: Rash noted. He is not diaphoretic. There is erythema.   Decreasing warmth, erythema, tenderness, and induration to area on right buttock near gluteal cleft. No drainage. There is tenderness of the left buttock near the anus. No warmth, erythema, or  induration at that area.     Nursing note and vitals reviewed.      Significant Labs: All pertinent labs within the past 24 hours have been reviewed.    Significant Imaging: I have reviewed and interpreted all pertinent imaging results/findings within the past 24 hours.

## 2018-12-28 NOTE — PROGRESS NOTES
[facility] Hospital Medicine  Progress Note    Patient Name: Rajeev Chowdhury  MRN: [id number]  Patient Class: IP- Inpatient   Admission Date: 12/26/2018  Length of Stay: 2 days  Attending Physician: Val Zavala MD  Primary Care Provider: Primary Doctor No        Subjective:     Principal Problem:Cellulitis of gluteal region    HPI:  Rajeev Chowdhury is a 48 y.o. male that (in part)  has no past medical history on file.  has a past surgical history that includes Wrist Arthroplasty and Skin graft. Presents to [facility] as a transfer patient from the [facility] stand-alone Emergency Department where he sought evaluation for erythema, pain, and warmth of his right upper buttocks.  He had an abscess drained in the area 2 days prior to presentation.  He is prescribed doxycycline after that and discharged home.  He has been compliant with his home medication regimen.  However he has had associated purulence and bloody drainage from the wound with increased warmth, edema, and erythema.  Daily frequency.  Constant duration.  Radiation throughout gluteal cleft.      In the emergency department routine laboratory studies were obtained which showed evidence of leukocytosis.  A call was placed to General surgery case discussed with him.  CT was performed that did no show evidence of further abscess formation.  Felt most likely to be cellulitis S failed outpatient antibiotic therapy.  Cultures were obtained and the patient was started on ampicillin sulbactam and Flagyl.    Hospital medicine has been asked to admit for further evaluation and treatment as a transfer patient.     Hospital Course:  Admitted for cellulitis of right buttock. Started on unasyn and flagyl. No signs of sepsis. Clinically improving.     Interval History: Right buttock pain improved. Feels that there is a new area developing on his lower left buttock near his anus. No fevers. Pain controlled.     Review of Systems    Constitutional: Negative for fever.   Respiratory: Negative for shortness of breath.    Cardiovascular: Negative for chest pain and leg swelling.   Gastrointestinal: Negative for abdominal pain, constipation, diarrhea, nausea and vomiting.   Skin: Positive for rash.     Objective:     Vital Signs (Most Recent):  Temp: 98.1 °F (36.7 °C) (12/28/18 0807)  Pulse: 80 (12/28/18 0807)  Resp: 17 (12/28/18 0807)  BP: (!) 156/86 (12/28/18 0807)  SpO2: 95 % (12/28/18 0807) Vital Signs (24h Range):  Temp:  [97.2 °F (36.2 °C)-98.2 °F (36.8 °C)] 98.1 °F (36.7 °C)  Pulse:  [80-92] 80  Resp:  [16-18] 17  SpO2:  [94 %-98 %] 95 %  BP: (152-183)/() 156/86     Weight: 108.2 kg (238 lb 9.6 oz)  Body mass index is 33.28 kg/m².    Intake/Output Summary (Last 24 hours) at 12/28/2018 0813  Last data filed at 12/27/2018 1800  Gross per 24 hour   Intake 360 ml   Output 700 ml   Net -340 ml      Physical Exam   Constitutional: He appears well-developed and well-nourished. No distress.   HENT:   Head: Normocephalic and atraumatic.   Cardiovascular: Normal rate, regular rhythm, normal heart sounds and intact distal pulses. Exam reveals no gallop and no friction rub.   No murmur heard.  Pulmonary/Chest: Effort normal and breath sounds normal. No stridor. No respiratory distress. He has no wheezes. He has no rales.   Abdominal: Soft. Bowel sounds are normal. He exhibits no distension and no mass. There is no tenderness. There is no guarding.   Musculoskeletal: He exhibits no edema.   Neurological: He is alert.   Skin: Rash noted. He is not diaphoretic. There is erythema.   Decreasing warmth, erythema, tenderness, and induration to area on right buttock near gluteal cleft. No drainage. There is tenderness of the left buttock near the anus. No warmth, erythema, or induration at that area.     Nursing note and vitals reviewed.      Significant Labs: All pertinent labs within the past 24 hours have been reviewed.    Significant Imaging: I have  reviewed and interpreted all pertinent imaging results/findings within the past 24 hours.    Assessment/Plan:      * Cellulitis of gluteal region    Presented with pain of right buttock near gluteal cleft  CT confirmed right gluteal fold cellulitis. Surgery reviewed images and stated that he could be managed medically with antibiotics  Started on unasyn and flagyl with improvement in cellulitis  No signs of sepsis  Continue to monitor  Patient is using IV pain medications sparingly       Tobacco abuse    Patient was counseled on smoking cessation for 4 minutes. We discussed how smoking is detrimental to the patient's health. Prescription for nicotine patch was offered. Patient accepted nicotine patch         VTE Risk Mitigation (From admission, onward)        Ordered     IP VTE HIGH RISK PATIENT  Once      12/27/18 0155     Place sequential compression device  Until discontinued      12/27/18 0155     Place BIBI hose  Until discontinued      12/27/18 0155              Val Zavala MD  Department of Hospital Medicine   [facility]

## 2018-12-28 NOTE — PLAN OF CARE
Problem: Adult Inpatient Plan of Care  Goal: Plan of Care Review  Outcome: Ongoing (interventions implemented as appropriate)  Continue with IVAB. Pain medicine as ordered. Present pain med. With adequate pain relief. Abscess to buttock red with little drainage. Surgery on case.

## 2018-12-28 NOTE — NURSING
Pt lying in the bed, eyes closed.  Chest exp full, even.  Breaths unlabored.  No complaints at this time.  Will monitor.

## 2018-12-28 NOTE — PLAN OF CARE
Problem: Adult Inpatient Plan of Care  Goal: Plan of Care Review  Outcome: Ongoing (interventions implemented as appropriate)  IV antibiotics administered as ordered. Complaint of severe pain moderately relieved with PRN medication. Bed alarm armed and audible. Remains free from falls or trauma.

## 2018-12-28 NOTE — ASSESSMENT & PLAN NOTE
Patient was counseled on smoking cessation for 4 minutes. We discussed how smoking is detrimental to the patient's health. Prescription for nicotine patch was offered. Patient accepted nicotine patch

## 2018-12-29 VITALS
WEIGHT: 238.63 LBS | RESPIRATION RATE: 17 BRPM | TEMPERATURE: 99 F | HEART RATE: 73 BPM | BODY MASS INDEX: 33.41 KG/M2 | OXYGEN SATURATION: 96 % | HEIGHT: 71 IN | DIASTOLIC BLOOD PRESSURE: 82 MMHG | SYSTOLIC BLOOD PRESSURE: 146 MMHG

## 2018-12-29 LAB
ANION GAP SERPL CALC-SCNC: 11 MMOL/L
BASOPHILS # BLD AUTO: 0.04 K/UL
BASOPHILS NFR BLD: 0.4 %
BUN SERPL-MCNC: 14 MG/DL
CALCIUM SERPL-MCNC: 8.9 MG/DL
CHLORIDE SERPL-SCNC: 105 MMOL/L
CO2 SERPL-SCNC: 24 MMOL/L
CREAT SERPL-MCNC: 1 MG/DL
DIFFERENTIAL METHOD: ABNORMAL
EOSINOPHIL # BLD AUTO: 0.2 K/UL
EOSINOPHIL NFR BLD: 2.6 %
ERYTHROCYTE [DISTWIDTH] IN BLOOD BY AUTOMATED COUNT: 12.9 %
EST. GFR  (AFRICAN AMERICAN): >60 ML/MIN/1.73 M^2
EST. GFR  (NON AFRICAN AMERICAN): >60 ML/MIN/1.73 M^2
GLUCOSE SERPL-MCNC: 89 MG/DL
HCT VFR BLD AUTO: 41.4 %
HGB BLD-MCNC: 13.6 G/DL
LYMPHOCYTES # BLD AUTO: 2.4 K/UL
LYMPHOCYTES NFR BLD: 25.6 %
MCH RBC QN AUTO: 30 PG
MCHC RBC AUTO-ENTMCNC: 32.9 G/DL
MCV RBC AUTO: 91 FL
MONOCYTES # BLD AUTO: 0.9 K/UL
MONOCYTES NFR BLD: 9.3 %
NEUTROPHILS # BLD AUTO: 5.8 K/UL
NEUTROPHILS NFR BLD: 62.1 %
PLATELET # BLD AUTO: 258 K/UL
PMV BLD AUTO: 10.6 FL
POTASSIUM SERPL-SCNC: 4.2 MMOL/L
RBC # BLD AUTO: 4.53 M/UL
SODIUM SERPL-SCNC: 140 MMOL/L
WBC # BLD AUTO: 9.4 K/UL

## 2018-12-29 PROCEDURE — 25000003 PHARM REV CODE 250: Performed by: NURSE PRACTITIONER

## 2018-12-29 PROCEDURE — S0030 INJECTION, METRONIDAZOLE: HCPCS | Performed by: NURSE PRACTITIONER

## 2018-12-29 PROCEDURE — 36415 COLL VENOUS BLD VENIPUNCTURE: CPT

## 2018-12-29 PROCEDURE — 80048 BASIC METABOLIC PNL TOTAL CA: CPT

## 2018-12-29 PROCEDURE — S4991 NICOTINE PATCH NONLEGEND: HCPCS | Performed by: HOSPITALIST

## 2018-12-29 PROCEDURE — 25000003 PHARM REV CODE 250: Performed by: HOSPITALIST

## 2018-12-29 PROCEDURE — 94761 N-INVAS EAR/PLS OXIMETRY MLT: CPT

## 2018-12-29 PROCEDURE — 85025 COMPLETE CBC W/AUTO DIFF WBC: CPT

## 2018-12-29 PROCEDURE — 63600175 PHARM REV CODE 636 W HCPCS: Performed by: NURSE PRACTITIONER

## 2018-12-29 RX ORDER — AMOXICILLIN AND CLAVULANATE POTASSIUM 875; 125 MG/1; MG/1
1 TABLET, FILM COATED ORAL EVERY 12 HOURS
Qty: 14 TABLET | Refills: 0 | Status: SHIPPED | OUTPATIENT
Start: 2018-12-29 | End: 2019-01-05

## 2018-12-29 RX ADMIN — AMPICILLIN AND SULBACTAM 1.5 G: 1; .5 INJECTION, POWDER, FOR SOLUTION INTRAVENOUS at 03:12

## 2018-12-29 RX ADMIN — HYDRALAZINE HYDROCHLORIDE 25 MG: 25 TABLET ORAL at 06:12

## 2018-12-29 RX ADMIN — NICOTINE 1 PATCH: 21 PATCH, EXTENDED RELEASE TRANSDERMAL at 08:12

## 2018-12-29 RX ADMIN — AMPICILLIN AND SULBACTAM 1.5 G: 1; .5 INJECTION, POWDER, FOR SOLUTION INTRAVENOUS at 08:12

## 2018-12-29 RX ADMIN — METRONIDAZOLE 500 MG: 500 INJECTION, SOLUTION INTRAVENOUS at 06:12

## 2018-12-29 NOTE — PLAN OF CARE
12/29/18 1014   Final Note   Assessment Type Final Discharge Note   Anticipated Discharge Disposition Home   What phone number can be called within the next 1-3 days to see how you are doing after discharge? (see chart)   Hospital Follow Up  Appt(s) scheduled? No  (PATIENT GIVEN INFO FOR [facility])   Discharge plans and expectations educations in teach back method with documentation complete? Yes   Right Care Referral Info   Referral Type NO CARE

## 2018-12-29 NOTE — DISCHARGE SUMMARY
[facility]  Hospital Medicine  Discharge Summary      Patient Name: Rajeev Chowdhury  MRN: [id number]  Admission Date: 12/26/2018  Hospital Length of Stay: 3 days  Discharge Date and Time:  12/29/2018 9:47 AM  Attending Physician: Val Zavala MD   Discharging Provider: Val Zavala MD  Primary Care Provider: Primary Doctor No      HPI:   Rajeev Chowdhury is a 48 y.o. male that (in part)  has no past medical history on file.  has a past surgical history that includes Wrist Arthroplasty and Skin graft. Presents to [facility] as a transfer patient from the [facility] stand-alone Emergency Department where he sought evaluation for erythema, pain, and warmth of his right upper buttocks.  He had an abscess drained in the area 2 days prior to presentation.  He is prescribed doxycycline after that and discharged home.  He has been compliant with his home medication regimen.  However he has had associated purulence and bloody drainage from the wound with increased warmth, edema, and erythema.  Daily frequency.  Constant duration.  Radiation throughout gluteal cleft.      In the emergency department routine laboratory studies were obtained which showed evidence of leukocytosis.  A call was placed to General surgery case discussed with him.  CT was performed that did no show evidence of further abscess formation.  Felt most likely to be cellulitis S failed outpatient antibiotic therapy.  Cultures were obtained and the patient was started on ampicillin sulbactam and Flagyl.    Hospital medicine has been asked to admit for further evaluation and treatment as a transfer patient.     * No surgery found *      Hospital Course:   Admitted for cellulitis of right buttock. Started on unasyn and flagyl. No signs of sepsis. Clinically improved. Discharged on augmentin x7 days. Patient has had some hypertension while in hospital, which may be due to pain or underlying essential hypertension. Will  need to be rechecked as outpatient. Counseled on smoking cessation. Need to reassess as outpatient. Patient needs to establish PCP- he will be given contact information.      Consults:     No new Assessment & Plan notes have been filed under this hospital service since the last note was generated.  Service: Hospital Medicine    Final Active Diagnoses:    Diagnosis Date Noted POA    PRINCIPAL PROBLEM:  Cellulitis of gluteal region [L03.317] 12/26/2018 Yes    Tobacco abuse [Z72.0] 12/27/2018 Yes      Problems Resolved During this Admission:       Discharged Condition: good    Disposition: Home or Self Care    Follow Up: Establish care with PCP    Patient Instructions:      Diet Adult Regular     Notify your health care provider if you experience any of the following:  temperature >100.4     Notify your health care provider if you experience any of the following:  persistent nausea and vomiting or diarrhea     Notify your health care provider if you experience any of the following:  severe uncontrolled pain     Notify your health care provider if you experience any of the following:  redness, tenderness, or signs of infection (pain, swelling, redness, odor or green/yellow discharge around incision site)     Notify your health care provider if you experience any of the following:  difficulty breathing or increased cough     Notify your health care provider if you experience any of the following:  severe persistent headache     Notify your health care provider if you experience any of the following:  worsening rash     Notify your health care provider if you experience any of the following:  persistent dizziness, light-headedness, or visual disturbances     Notify your health care provider if you experience any of the following:  increased confusion or weakness     Activity as tolerated       Significant Diagnostic Studies: Labs: All labs within the past 24 hours have been reviewed    Pending Diagnostic Studies:      None         Medications:  Reconciled Home Medications:      Medication List      START taking these medications    amoxicillin-clavulanate 875-125mg 875-125 mg per tablet  Commonly known as:  AUGMENTIN  Take 1 tablet by mouth every 12 (twelve) hours. for 7 days        STOP taking these medications    albuterol 90 mcg/actuation inhaler  Commonly known as:  PROVENTIL/VENTOLIN HFA     doxycycline 100 MG Cap  Commonly known as:  VIBRAMYCIN     ibuprofen 800 MG tablet  Commonly known as:  ADVIL,MOTRIN            Indwelling Lines/Drains at time of discharge:   Lines/Drains/Airways          None          Time spent on the discharge of patient: 35 minutes  Patient was seen and examined on the date of discharge and determined to be suitable for discharge.         Val Zavala MD  Department of Hospital Medicine  [facility]

## 2018-12-29 NOTE — PLAN OF CARE
"TN completed discharge needs assessment. TN provided and reviewed with patient "[facility] My Health Packet" , "Help At Home" .  Celluliltis with teachback.1.  Increase swelling, 2.  Fever TN discussed with patient the things the patient is responsible for to manage patient's  healthcare at home. Patient verbalized understanding & teachback implemented. Patient prefers morning doctor appointments.     12/27/18 1456   Discharge Assessment   Assessment Type Discharge Planning Assessment   Confirmed/corrected address and phone number on facesheet? Yes   Assessment information obtained from? Patient   Communicated expected length of stay with patient/caregiver no   Prior to hospitilization cognitive status: Alert/Oriented;No Deficits   Prior to hospitalization functional status: Independent   Current cognitive status: Alert/Oriented;No Deficits   Current Functional Status: Independent   Lives With parent(s)   Able to Return to Prior Arrangements yes   Is patient able to care for self after discharge? Yes   Who are your caregiver(s) and their phone number(s)? (mother)   Patient's perception of discharge disposition admitted as an inpatient   Readmission Within the Last 30 Days no previous admission in last 30 days   Patient currently being followed by outpatient case management? No   Patient currently receives any other outside agency services? No   Equipment Currently Used at Home none   Do you have any problems affording any of your prescribed medications? No   Is the patient taking medications as prescribed? yes   Does the patient have transportation home? Yes   Transportation Anticipated family or friend will provide   Does the patient receive services at the Coumadin Clinic? No   Discharge Plan A Home with family   Discharge Plan B Home with family   Patient/Family in Agreement with Plan yes     [id number] [facility] [place] - [address] [place] [address] EXP AT [facility] [id number] [facility] EXP  [place] " [address]  Phone: [phone] Fax: [phone]

## 2018-12-29 NOTE — PLAN OF CARE
Problem: Adult Inpatient Plan of Care  Goal: Plan of Care Review  Outcome: Resolved for Upgrade Date Met: 12/29/18    Goal: Patient-Specific Goal (Individualization)  Outcome: Outcome(s) achieved Date Met: 12/29/18  Pt adequate for d/c    Problem: Skin or Soft Tissue Infection  Goal: Infection Symptom Resolution  Outcome: Ongoing (interventions implemented as appropriate)  abx on discharge

## 2018-12-29 NOTE — PROGRESS NOTES
Pt w/bilat BIBI hose in place but refusing to wear SCDs, pt educated on importance of medical therapy, will cont to educate

## 2018-12-29 NOTE — PROGRESS NOTES
TN informed med surg nurse, Amber that pt is cleared for discharge from [facility]..Morelia Bernard RN, BSN, [facility]  12/29/2018

## 2018-12-29 NOTE — PROGRESS NOTES
WRITTEN DISCHARGE INFORMATION:     Follow-up Information     Schedule an appointment as soon as possible for a visit with Omar Pappas MD.    Specialty:  Family Medicine  Why:  out patient services:  [facility] [[email or web address]]  Contact information:  [address]  [facility] [id number] [phone]               Things that YOU are responsible for to Manage Your Care At Home:  1. Getting your prescriptions filled.  2. Taking you medications as directed. DO NOT MISS ANY DOSES!  3. Going to your follow-up doctor appointments. This is important because it allows the doctor to monitor your progress and to determine if any changes need to be made to your treatment plan.                             Help at Home  After discharge for assistance [facility] 24/7 assistance  [phone]   Thank you for choosing [facility] for your care.  Sincerely, Your [facility] is,  Morelia Bernard RN [facility] [phone]

## 2018-12-29 NOTE — PROGRESS NOTES
Pt IV d/c w/tip intact 2x2 & tape applied, pt given discharge f/u & prescription info, pt verbalized understanding and all questions addressed, pt declined WC and ambulated off [facility] w/all belongings and family at side nadn

## 2019-01-01 LAB
BACTERIA BLD CULT: NORMAL
BACTERIA BLD CULT: NORMAL

## 2020-07-20 ENCOUNTER — HOSPITAL ENCOUNTER (EMERGENCY)
Facility: HOSPITAL | Age: 50
Discharge: HOME | End: 2020-07-20
Attending: EMERGENCY MEDICINE

## 2020-07-20 VITALS
BODY MASS INDEX: 33.6 KG/M2 | HEART RATE: 95 BPM | WEIGHT: 240 LBS | TEMPERATURE: 98 F | RESPIRATION RATE: 18 BRPM | SYSTOLIC BLOOD PRESSURE: 172 MMHG | DIASTOLIC BLOOD PRESSURE: 98 MMHG | OXYGEN SATURATION: 97 % | HEIGHT: 71 IN

## 2020-07-20 DIAGNOSIS — L30.9 DERMATITIS: Primary | ICD-10-CM

## 2020-07-20 PROCEDURE — 99283 EMERGENCY DEPT VISIT LOW MDM: CPT | Mod: ER

## 2020-07-20 RX ORDER — TRIAMCINOLONE ACETONIDE 1 MG/G
CREAM TOPICAL 2 TIMES DAILY
Qty: 15 G | Refills: 0 | Status: SHIPPED | OUTPATIENT
Start: 2020-07-20

## 2020-07-20 NOTE — Clinical Note
Rajeev Chowdhury was seen and treated in our emergency department on 7/20/2020.  He may return to work on 07/21/2020.       If you have any questions or concerns, please don't hesitate to call.      PAM Cartagena RN

## 2020-07-21 NOTE — ED PROVIDER NOTES
Encounter Date: 7/20/2020    SCRIBE #1 NOTE: I, Jessie Moss, am scribing for, and in the presence of,  Dr. Fisher. I have scribed the following portions of the note - Other sections scribed: HPI, ROS, PE.       History     Chief Complaint   Patient presents with    Rash     pt c/o rash to left forearm x 2 weeks with intermittent itching. denies using different soaps or lotions. denies pain     This patient is a 49 y.o. male presenting to the ED with a rash along his right arm. States his job wanted him to come get checked. Denies new detergent or soap use.    The history is provided by the patient. No  was used.     Review of patient's allergies indicates:  No Known Allergies  History reviewed. No pertinent past medical history.  Past Surgical History:   Procedure Laterality Date    SKIN GRAFT      R leg    WRIST ARTHROPLASTY       Family History   Problem Relation Age of Onset    Diabetes Mother     Hypertension Mother     Hypertension Father     Diabetes Father     Diabetes Maternal Aunt     Diabetes Maternal Grandmother     Hypertension Maternal Grandmother      Social History     Tobacco Use    Smoking status: Current Every Day Smoker     Packs/day: 1.00     Years: 30.00     Pack years: 30.00     Types: Cigarettes   Substance Use Topics    Alcohol use: No     Comment: less than once per week    Drug use: No     Review of Systems   Constitutional: Negative.  Negative for fever.   HENT: Negative.  Negative for sore throat.    Eyes: Negative.  Negative for pain.   Respiratory: Negative.  Negative for shortness of breath.    Cardiovascular: Negative.  Negative for chest pain.   Gastrointestinal: Negative.  Negative for abdominal pain and vomiting.   Genitourinary: Negative.  Negative for dysuria.   Musculoskeletal: Negative.  Negative for back pain.   Skin: Positive for rash.   Neurological: Negative.  Negative for headaches.   Hematological: Negative.     Psychiatric/Behavioral: Negative.    All other systems reviewed and are negative.      Physical Exam     Initial Vitals [07/20/20 2109]   BP Pulse Resp Temp SpO2   (!) 176/104 95 18 98.3 °F (36.8 °C) 97 %      MAP       --         Physical Exam    Nursing note and vitals reviewed.  Constitutional: He appears well-developed and well-nourished.   HENT:   Head: Normocephalic and atraumatic.   Eyes: Conjunctivae and EOM are normal.   Neck: Normal range of motion. Neck supple.   Cardiovascular: Normal rate and intact distal pulses.   Pulmonary/Chest: Effort normal. No respiratory distress.   Abdominal: Soft. There is no abdominal tenderness.   Musculoskeletal: Normal range of motion.   Neurological: He is alert and oriented to person, place, and time.   Skin: Skin is warm and dry. Rash noted. Rash is maculopapular. No erythema.   7x8cm rash to elbow area. Negative warmth and negative tenderness.   Psychiatric: He has a normal mood and affect. His behavior is normal.         ED Course   Procedures  Labs Reviewed - No data to display       Imaging Results    None          Medical Decision Making:   History:   Old Medical Records: I decided to obtain old medical records.            Scribe Attestation:   Scribe #1: I performed the above scribed service and the documentation accurately describes the services I performed. I attest to the accuracy of the note.    This document was produced by a scribe under my direction and in my presence. I agree with the content of the note and have made any necessary edits.     Des Fisher MD    07/20/2020 11:11 PM                      Clinical Impression:     1. Dermatitis                ED Disposition Condition    Discharge Stable        ED Prescriptions     Medication Sig Dispense Start Date End Date Auth. Provider    triamcinolone acetonide 0.1% (KENALOG) 0.1 % cream Apply topically 2 (two) times daily. 15 g 7/20/2020  Des Fisher MD        Follow-up Information      Follow up With Specialties Details Why Contact Info    Your PCP as needed                                         Des Fisher MD  07/20/20 1393

## 2020-07-21 NOTE — ED NOTES
Pt denies history of HTN.  Reports he did smoke a cigarette just prior to entering ER.  Dr Fisher states ok to OR.  Pt strongly encouraged to follow up with PCP for BP management.  V/U.